# Patient Record
Sex: FEMALE | Race: BLACK OR AFRICAN AMERICAN | NOT HISPANIC OR LATINO | URBAN - METROPOLITAN AREA
[De-identification: names, ages, dates, MRNs, and addresses within clinical notes are randomized per-mention and may not be internally consistent; named-entity substitution may affect disease eponyms.]

---

## 2018-04-26 PROBLEM — Z00.00 ENCOUNTER FOR PREVENTIVE HEALTH EXAMINATION: Status: ACTIVE | Noted: 2018-04-26

## 2018-05-08 ENCOUNTER — OUTPATIENT (OUTPATIENT)
Dept: OUTPATIENT SERVICES | Facility: HOSPITAL | Age: 18
LOS: 1 days | Discharge: HOME | End: 2018-05-08

## 2018-05-08 ENCOUNTER — APPOINTMENT (OUTPATIENT)
Dept: ANTEPARTUM | Facility: CLINIC | Age: 18
End: 2018-05-08

## 2018-05-22 ENCOUNTER — APPOINTMENT (OUTPATIENT)
Dept: ANTEPARTUM | Facility: CLINIC | Age: 18
End: 2018-05-22

## 2018-05-22 ENCOUNTER — OUTPATIENT (OUTPATIENT)
Dept: OUTPATIENT SERVICES | Facility: HOSPITAL | Age: 18
LOS: 1 days | Discharge: HOME | End: 2018-05-22

## 2018-05-22 DIAGNOSIS — O35.8XX0 MATERNAL CARE FOR OTHER (SUSPECTED) FETAL ABNORMALITY AND DAMAGE, NOT APPLICABLE OR UNSPECIFIED: ICD-10-CM

## 2019-04-18 ENCOUNTER — EMERGENCY (EMERGENCY)
Facility: HOSPITAL | Age: 19
LOS: 0 days | Discharge: HOME | End: 2019-04-18
Attending: EMERGENCY MEDICINE | Admitting: EMERGENCY MEDICINE
Payer: MEDICAID

## 2019-04-18 VITALS
TEMPERATURE: 99 F | HEART RATE: 95 BPM | DIASTOLIC BLOOD PRESSURE: 60 MMHG | RESPIRATION RATE: 18 BRPM | SYSTOLIC BLOOD PRESSURE: 135 MMHG | OXYGEN SATURATION: 100 %

## 2019-04-18 DIAGNOSIS — K02.9 DENTAL CARIES, UNSPECIFIED: ICD-10-CM

## 2019-04-18 DIAGNOSIS — K08.89 OTHER SPECIFIED DISORDERS OF TEETH AND SUPPORTING STRUCTURES: ICD-10-CM

## 2019-04-18 PROCEDURE — 99282 EMERGENCY DEPT VISIT SF MDM: CPT

## 2019-04-18 NOTE — ED PROVIDER NOTE - NS ED ROS FT
General: No fever, no chills  Eyes:  No visual changes, eye pain or discharge.  ENMT:  +Right upper tooth pain, No hearing changes, pain, no sore throat or runny nose, no difficulty swallowing  Cardiac:  No chest pain, SOB or edema. No chest pain with exertion.  Respiratory:  No cough or respiratory distress. No hemoptysis. No history of asthma or RAD.  GI:  No nausea, vomiting, diarrhea or abdominal pain.  :  No dysuria, frequency or burning.  MS:  No myalgia, muscle weakness, joint pain or back pain.  Neuro:  No headache or weakness.  No LOC.  Skin:  No skin rash.   Endocrine: No history of thyroid disease or diabetes.

## 2019-04-18 NOTE — ED PROVIDER NOTE - PHYSICAL EXAMINATION
CONSTITUTIONAL: Well-developed; well-nourished; in no acute distress.   SKIN: warm, dry  HEAD: Normocephalic; atraumatic.  EYES: PERRL, EOMI, no conjunctival erythema  ENT: +Tenderness in tooth 1, No nasal discharge; +Dental carries in tooth 32, airway clear, no fluctuance, or swelling  NECK: Supple; non tender.  CARD: S1, S2 normal; no murmurs, gallops, or rubs. Regular rate and rhythm.   RESP: No wheezes, rales or rhonchi.  ABD: soft ntnd  EXT: Normal ROM.  No clubbing, cyanosis or edema.   LYMPH: No acute cervical adenopathy.  NEURO: Alert, oriented, grossly unremarkable  PSYCH: Cooperative, appropriate.

## 2019-04-18 NOTE — CONSULT NOTE ADULT - ASSESSMENT
Medical history reviewed with patient. Blood pressure taken. Explained to patient that she did not have to delay dental care due to pregnancy. Patient had been under the impression that she could not receive the care. Provided patient with a business card for the hours of operation and phone number of the dental clinic as she requires extensive care.     Clinical exam: Generalized heavy calculus. Several visibly cavitated teeth that require extraction.   Radiographic exam and interpretation: Panoramic taken. Teeth #2, #15, #18, #30,  #31, and 32 have caries into the pulp and are not restorable.  Tooth #17 is impacted with occlusal caries and requires extraction. Tooth # 14 has caries and requires excavation and potential restoration. Bitewings are needed to better address the status of the premolars. Periapical radiolucency #30 and #31  Plan: extraction of tooth #2 which is symptomatic. Teeth #15,#17,#18,#30, #31, and #32 are asymptomatic.  Procedure: Explained to patient that she has several teeth that require extraction and about the teeth in the lower left quadrant with infection. Explained that since patient reports pain to tooth #2 this tooth will be extracted. Advised patient to return for comprehensive treatment in order to remove the heavy calculus and extract the teeth. Explained to the patient that she is at high risk for future swellings and is strongly advised to seek care. Explained that despite extracting tooth #2 she might continue to experience discomfort/pain.   Discussed risks/benefits/alternative to treatment as per oral surgery sheet 7/13/00. Consent and side/site signed, dated, timed. Administered 2 carpules 4% articaine 1:100,000 epinephrine via buccal/palatal infiltration. Nonsurgical extraction tooth #2. Curetted socket. Post operative radiograph. Post operative instructions given verbal/written. Hemostasis achieved  Prescriptions: Amoxicillin 500 mg q8h 7 days and Ibuprofen 600 mg PRN  Follow up: with primary dentist for care and/or [facility] for comprehensive care or emergency visits.

## 2019-04-18 NOTE — ED PROVIDER NOTE - OBJECTIVE STATEMENT
18 y F with pmh of dental carries with right upper wisdom tooth pain, no nausea/vomitting, no fever, no swelling, no difficulty swallowing 18 y F with pmh of dental carries with right upper wisdom tooth pain, no nausea/vomitting, no fever, no swelling, no difficulty swallowing.

## 2019-04-18 NOTE — ED PROVIDER NOTE - CLINICAL SUMMARY MEDICAL DECISION MAKING FREE TEXT BOX
Odontalgia - no acute process requiring ED intervention. Suitable for discharge to Dental. Pt voiced understanding and agrees with plan.

## 2019-04-18 NOTE — CONSULT NOTE ADULT - SUBJECTIVE AND OBJECTIVE BOX
Patient reports her dentist telling her that she requires extraction of her wisdom teeth. Patient decided to delay treatment due to pregnancy. Patient reports taking tylenol and motrin as needed for pain

## 2020-07-18 NOTE — ED ADULT NURSE NOTE - EXTENSIONS OF SELF_ADULT
[address] [facility] [place] EMERGENCY DEPT  eMERGENCY dEPARTMENT eNCOUnter      Pt Name: Yasir Garza  MRN: [id number]  [place] 1969  Date of evaluation: 7/18/2020  Provider: Luis Rebolledo MD    [address] [place] [address]       Chief Complaint   Patient presents with    Back Pain     pain getting worse since 4 days         HISTORY OF PRESENT ILLNESS   (Location/Symptom, Timing/Onset,Context/Setting, Quality, Duration, Modifying Factors, Severity)  Note limiting factors. Yasir Garza is a 44 y.o. female who presents to the emergency department with back pain. Patient has a history of scoliosis and sciatica. She says she has broken her tailbone several times in the past.  She said she just moved here and has been doing a lot of lifting over the last few days. She had no fall or injury. Her back pain has gradually been getting worse over the last 4 days. Pain radiates down her right leg. Denies any bowel or bladder incontinence. No numbness or tingling. She said her right leg is weak and gives out at times but this is not a new issue. HPI    NursingNotes were reviewed. REVIEW OF SYSTEMS    (2-9 systems for level 4, 10 or more for level 5)     Review of Systems   Constitutional: Negative for chills and fever. HENT: Negative for rhinorrhea and sore throat. Respiratory: Negative for shortness of breath. Cardiovascular: Negative for chest pain and leg swelling. Gastrointestinal: Negative for abdominal pain, diarrhea, nausea and vomiting. Genitourinary: Negative for difficulty urinating. Musculoskeletal: Positive for back pain. Negative for neck pain. Skin: Negative for rash. Neurological: Positive for weakness. Negative for headaches. Psychiatric/Behavioral: Negative for confusion. A complete review of systems was performed and is negative except as noted above in the HPI.        PAST MEDICAL HISTORY     Past Medical History:   Diagnosis Date    COPD (chronic obstructive pulmonary disease) ([facility] [address]     Scoliosis          SURGICAL HISTORY     History reviewed. No pertinent surgical history. CURRENT MEDICATIONS       Previous Medications    CLONAZEPAM (KLONOPIN) 0.5 MG TABLET    Take 1 mg by mouth 2 times daily as needed. ONDANSETRON (ZOFRAN-ODT) 4 MG DISINTEGRATING TABLET    Take 1 tablet by mouth 3 times daily as needed for Nausea or Vomiting    ZOLPIDEM (AMBIEN) 10 MG TABLET    Take by mouth nightly as needed for Sleep. ALLERGIES     Aspirin; Ibuprofen; Iodine; and Sulfa antibiotics    FAMILY HISTORY     History reviewed. No pertinent family history.        SOCIAL HISTORY       Social History     Socioeconomic History    Marital status:      Spouse name: None    Number of children: None    Years of education: None    Highest education level: None   Occupational History    None   Social Needs    Financial resource strain: None    Food insecurity     Worry: None     Inability: None    Transportation needs     Medical: None     Non-medical: None   Tobacco Use    Smoking status: Current Every Day Smoker     Packs/day: 0.50    Smokeless tobacco: Never Used   Substance and Sexual Activity    Alcohol use: Never     Frequency: Never    Drug use: Never    Sexual activity: None   Lifestyle    Physical activity     Days per week: None     Minutes per session: None    Stress: None   Relationships    Social connections     Talks on phone: None     Gets together: None     Attends Baptism service: None     Active member of club or organization: None     Attends meetings of clubs or organizations: None     Relationship status: None    Intimate partner violence     Fear of current or ex partner: None     Emotionally abused: None     Physically abused: None     Forced sexual activity: None   Other Topics Concern    None   Social History Narrative    None       SCREENINGS             PHYSICAL EXAM    (up to 7 for level 4, 8 or more for level 5)     ED Triage Vitals [07/18/20 0724]   BP Temp Temp src Pulse Resp SpO2 Height Weight   104/81 98.3 °F (36.8 °C) -- 82 18 98 % 5' 1\" (1.549 m) 105 lb (47.6 kg)       Physical Exam  Vitals signs and nursing note reviewed. Constitutional:       General: She is not in acute distress. Appearance: She is well-developed. She is not diaphoretic. HENT:      Head: Normocephalic and atraumatic. Eyes:      Pupils: Pupils are equal, round, and reactive to light. Neck:      Musculoskeletal: Normal range of motion and neck supple. Cardiovascular:      Rate and Rhythm: Normal rate and regular rhythm. Heart sounds: Normal heart sounds. Pulmonary:      Effort: Pulmonary effort is normal. No respiratory distress. Breath sounds: Normal breath sounds. Abdominal:      General: Bowel sounds are normal. There is no distension. Palpations: Abdomen is soft. Tenderness: There is no abdominal tenderness. Musculoskeletal: Normal range of motion. General: Signs of injury present. Comments: Normal reflexes and sensation. Diffuse soft tissue tenderness over lower back   Skin:     General: Skin is warm and dry. Findings: No rash. Neurological:      Mental Status: She is alert and oriented to person, place, and time. Cranial Nerves: No cranial nerve deficit. Motor: No abnormal muscle tone.       Coordination: Coordination normal.      Deep Tendon Reflexes: Reflexes normal.   Psychiatric:         Behavior: Behavior normal.         DIAGNOSTIC RESULTS     EKG: All EKG's are interpreted by the Emergency Department Physician who either signs or Co-signs this chart in the absence of a cardiologist.        RADIOLOGY:   Non-plain film images such as CT, Ultrasound and MRI are read by the radiologist. Plainradiographic images are visualized and preliminarily interpreted by the emergency physician with the below findings:        Interpretation per the Radiologist below, if available at the time of this note:    No orders to display ED BEDSIDE ULTRASOUND:   Performed by ED Physician - none    LABS:  Labs Reviewed - No data to display    All other labs were within normal range or not returned as of this dictation. EMERGENCY DEPARTMENT COURSE and DIFFERENTIALDIAGNOSIS/MDM:   Vitals:    Vitals:    07/18/20 0724   BP: 104/81   Pulse: 82   Resp: 18   Temp: 98.3 °F (36.8 °C)   SpO2: 98%   Weight: 105 lb (47.6 kg)   Height: 5' 1\" (1.549 m)       MDM  Ho chronic issue with no saddle anesthesia or incontinence. No fall. Trey Sullivan [id number]    CONSULTS:  None    PROCEDURES:  Unless otherwise notedbelow, none     Procedures    FINAL IMPRESSION     1. Acute exacerbation of chronic low back pain          DISPOSITION/PLAN   DISPOSITION Discharge - Pending Orders Complete 07/18/2020 08:03:34 AM      PATIENT REFERRED TO:  @FUP@    DISCHARGE MEDICATIONS:  New Prescriptions    CYCLOBENZAPRINE (FLEXERIL) 10 MG TABLET    Take 1 tablet by mouth 3 times daily as needed for Muscle spasms    HYDROCODONE-ACETAMINOPHEN (NORCO) 5-325 MG PER TABLET    Take 1 tablet by mouth every 6 hours as needed for Pain for up to 3 days.           (Please note that portions of this note were completed with a voice recognition program.  Efforts were made to edit the dictations butoccasionally words are mis-transcribed.)    Gianni Key MD (electronically signed)  AttendingEmergency Physician         Gianni Key MD  07/18/20 0167 None

## 2020-09-14 ENCOUNTER — EMERGENCY (EMERGENCY)
Facility: HOSPITAL | Age: 20
LOS: 0 days | Discharge: HOME | End: 2020-09-14
Attending: EMERGENCY MEDICINE | Admitting: EMERGENCY MEDICINE
Payer: MEDICAID

## 2020-09-14 VITALS
SYSTOLIC BLOOD PRESSURE: 126 MMHG | RESPIRATION RATE: 18 BRPM | TEMPERATURE: 98 F | HEART RATE: 77 BPM | DIASTOLIC BLOOD PRESSURE: 78 MMHG | OXYGEN SATURATION: 99 %

## 2020-09-14 DIAGNOSIS — K08.89 OTHER SPECIFIED DISORDERS OF TEETH AND SUPPORTING STRUCTURES: ICD-10-CM

## 2020-09-14 DIAGNOSIS — F17.200 NICOTINE DEPENDENCE, UNSPECIFIED, UNCOMPLICATED: ICD-10-CM

## 2020-09-14 PROCEDURE — 99282 EMERGENCY DEPT VISIT SF MDM: CPT

## 2020-09-14 NOTE — ED PROVIDER NOTE - PHYSICAL EXAMINATION
VITALS:  I have reviewed the initial vital signs.  GENERAL: Well-developed, well-nourished, in no acute distress. Nontoxic.  HEENT: MMM, tolerating oral secretions. Multiple caries with +ttp over tooth #2. No abscess. No floor of mouth or submandibular swelling. No tongue elevation.    NECK: supple w FROM.   PULM: Normal effort. No tachypnea or retractions. No stridor.   SKIN: Warm, dry.  NEURO: A&Ox3. Speech clear. CN II-XII intact. No focal deficits.

## 2020-09-14 NOTE — ED PROVIDER NOTE - OBJECTIVE STATEMENT
20 year old female w no pmhx presents to the ED for evaluation of constant, mild, non-radiating right upper dental pain x 3 days. Pt went to [facility] ED on Friday for same symptoms, started on Amoxicillin and Percocet w/o improvement in her pain. Denies fevers/chills, facial swelling, sore throat, difficulty swallowing, voice change, difficulty breathing, n/v, recent dental procedures.

## 2020-09-14 NOTE — ED PROVIDER NOTE - NS ED ROS FT
CONSTITUTIONAL: (-) fevers, (-) chills, (-) malaise  ENT: see HPI, (-) facial swelling, (-) difficulty swallowing  NECK: (-) neck pain, (-) neck stiffness  PULM: (-) cough, (-) shortness of breath, (-) stridor  GI: (-) nausea, (-) vomiting    *all other systems negative except as documented above and in the HPI*

## 2020-09-18 PROBLEM — Z78.9 OTHER SPECIFIED HEALTH STATUS: Chronic | Status: ACTIVE | Noted: 2019-04-18

## 2022-04-08 ENCOUNTER — EMERGENCY (EMERGENCY)
Facility: HOSPITAL | Age: 22
LOS: 0 days | Discharge: HOME | End: 2022-04-08
Attending: EMERGENCY MEDICINE | Admitting: EMERGENCY MEDICINE
Payer: MEDICAID

## 2022-04-08 VITALS
SYSTOLIC BLOOD PRESSURE: 171 MMHG | DIASTOLIC BLOOD PRESSURE: 70 MMHG | OXYGEN SATURATION: 100 % | RESPIRATION RATE: 17 BRPM | TEMPERATURE: 98 F | HEART RATE: 88 BPM

## 2022-04-08 DIAGNOSIS — K08.89 OTHER SPECIFIED DISORDERS OF TEETH AND SUPPORTING STRUCTURES: ICD-10-CM

## 2022-04-08 DIAGNOSIS — K03.81 CRACKED TOOTH: ICD-10-CM

## 2022-04-08 DIAGNOSIS — K02.9 DENTAL CARIES, UNSPECIFIED: ICD-10-CM

## 2022-04-08 DIAGNOSIS — F17.200 NICOTINE DEPENDENCE, UNSPECIFIED, UNCOMPLICATED: ICD-10-CM

## 2022-04-08 PROCEDURE — 99284 EMERGENCY DEPT VISIT MOD MDM: CPT

## 2022-04-08 NOTE — ED PROVIDER NOTE - NS ED ATTENDING STATEMENT MOD
[Nl] : Integumentary This was a shared visit with the TOOTIE. I reviewed and verified the documentation and independently performed the documented:

## 2022-04-08 NOTE — ED PROVIDER NOTE - OBJECTIVE STATEMENT
21-year-old with no past medical history presenting with right lower gum pain x2 days. Symptoms are moderate Pain is worse with palpation. No alleviating factors.  States that part of her tooth fell off 2 days ago while eating. No fever, chills, difficulty breathing, foul odor, trismus, discharge, warmth, decreased PO fluids, malaise, loose teeth.

## 2022-04-08 NOTE — ED ADULT NURSE NOTE - OBJECTIVE STATEMENT
pt c/o right lower gum pain. right lower molar not present. redness and swelling noted to right lower gums. denies fever/chills.

## 2022-04-08 NOTE — ED PROVIDER NOTE - NS ED ROS FT
Review of Systems:  	•	CONSTITUTIONAL - no fever, no diaphoresis, no chills  	•	SKIN - no rash  	•	HEMATOLOGIC - no bleeding, no bruising  	•	EYES - no eye pain, no blurry vision  	•	ENT - +gum pain no congestion  	•	RESPIRATORY - no shortness of breath, no cough  	•	CARDIAC - no chest pain, no palpitations  	•	GI - no nausea, no vomiting  	•	GENITO-URINARY - no dysuria; no hematuria, no increased urinary frequency  	•	MUSCULOSKELETAL - no joint paint, no swelling, no redness  	•	NEUROLOGIC - no weakness, no headache, no paresthesias, no LOC  	•	PSYCH - no anxiety, no depression  	All other ROS are negative except as documented in HPI.

## 2022-04-08 NOTE — ED ADULT NURSE NOTE - NSIMPLEMENTINTERV_GEN_ALL_ED
Implemented All Universal Safety Interventions:  [place] to call system. Call bell, personal items and telephone within reach. Instruct patient to call for assistance. Room bathroom lighting operational. Non-slip footwear when patient is off stretcher. Physically safe environment: no spills, clutter or unnecessary equipment. Stretcher in lowest position, wheels locked, appropriate side rails in place.

## 2022-04-08 NOTE — ED PROVIDER NOTE - ATTENDING CONTRIBUTION TO CARE
22 yo   presents with toothache.  Tender gum in space for tooth# 30, broken.  No abscess.  [facility] dental clinic 20 yo f presents with toothache.  Tender gum in space for tooth# 30, broken.  No abscess.  [facility] dental clinic

## 2022-04-08 NOTE — ED PROVIDER NOTE - ATTENDING SHARED VISIT SELECTORS
Medical Decision Making
covid vaccine: moderna 2 doses   Denies history of covid infection, recent international travel or exposure to known +covid person

## 2022-04-08 NOTE — CONSULT NOTE ADULT - SUBJECTIVE AND OBJECTIVE BOX
Patient is a 21y old  Female who presents with a chief complaint of tooth pain on the lower right side.    HPI: Patient reported pain started a few days ago. Patient reported taking motrin and tylenol and the medications were not helping.      PAST MEDICAL & SURGICAL HISTORY:  No pertinent past medical history    ( -  ) heart valve replacement  ( -  ) joint replacement  ( -  ) pregnancy    MEDICATIONS  (STANDING):    MEDICATIONS  (PRN): Motrin, Tylenol      Allergies    No Known Allergies    *SOCIAL HISTORY: (  - ) Tobacco; ( -  ) ETOH    *Last Dental Visit: unknown    Vital Signs Last 24 Hrs  T(C): 36.4 (08 Apr 2022 09:32), Max: 36.4 (08 Apr 2022 09:32)  T(F): 97.5 (08 Apr 2022 09:32), Max: 97.5 (08 Apr 2022 09:32)  HR: 88 (08 Apr 2022 09:32) (88 - 88)  BP: 171/70 (08 Apr 2022 09:32) (171/70 - 171/70)  BP(mean): --  RR: 17 (08 Apr 2022 09:32) (17 - 17)  SpO2: 100% (08 Apr 2022 09:32) (100% - 100%)    EOE:  TMJ ( -  ) clicks                     ( -  ) pops                     ( -  ) crepitus             Mandible <<FROM>>             Facial bones and MOM <<grossly intact>>             ( -  ) trismus             ( -  ) lymphadenopathy             ( -  ) swelling             ( -  ) asymmetry             ( -  ) palpation             ( -  ) dyspnea             ( -  ) dysphagia             ( -  ) loss of consciousness    IOE:  <<permanent> dentition: <<multiple carious teeth>>            hard/soft palate:  ( -  ) palatal torus, <<No pathology noted>>           tongue/FOM <<No pathology noted>>           labial/buccal mucosa <<No pathology noted>>           ( + ) percussion: #30,31, and #32           ( + ) palpation: right posterior lower area           ( + ) swelling: in the area of #30,31,32           ( + ) abscess: buccal gingival area of #30 and 31           ( - ) sinus tract    *DENTAL RADIOGRAPHS: Panoramic and periapical. retained root tips of #30 and 31 with periapical radiolucency. #32 large dental caries, horizontally impacted.    *ASSESSMENT: Patient is a 21y old  Female who presents with a chief complaint of tooth pain on the lower right side. Patient reported pain started a few days ago. Patient reported taking motrin and tylenol and the medications were not helping. Extraoral exam within normal limit. Intraoral exam noted swelling in the area of #30, 31, and 32, tender to palpation. Retained root tips of 30 and 31. #32 with clinical caries into the pulp. Recommend extraction of 30, 31, and 32. Patient agrees.    *PLAN: Extraction of #30, 31, and 32 under local anesthesia. Antibiotic and pain medication.    PROCEDURE: Extraction of #30, 31, and 32 under local anesthesia attempted.  Verbal and written consent given.  Anesthesia: 2% lidocaine with 1:100,000 epinephrine and 3% mepivacaine  Treatment: Risk and benefit discussed with patient as per OS sheet dated 7/13/2000. Consent signed. Side/site verified. Topical applied. Inferior alveolar never block, long buccal, local infiltrations with 3 carpules of 2% lidocaine with 1:100,000 and 2 carpules of 3% mepivacaine. Patient reported feeling numb on the tongue, right lower lip, as well as the buccal and lingual gingiva. Unable to achieve adequate anesthesia for patient to tolerate procedure due to existing infection. Recommend patient to take antibiotics and pain medication and return for oral surgery appointment next week. Patient understands and agrees. Hemostasis achieved. Prescribed amoxicillin 500 mg T1D for 7 days and Ibuprofen 600 mg q6h as needed for pain.    RECOMMENDATIONS:  1) Patient to take prescribed medication.   2) Patient to return to dental clinic next Wednesday for oral surgery appointment.   3) If any difficulty swallowing/breathing, fever occur, return to ER.     Iva Santana DDS, pager #[phone]

## 2022-04-08 NOTE — ED PROVIDER NOTE - PHYSICAL EXAMINATION
VITAL SIGNS: I have reviewed nursing notes and confirm.  CONSTITUTIONAL: Well-developed; well-nourished; in no acute distress.  SKIN: Skin exam is warm and dry, no acute rash.  HEAD: Normocephalic; atraumatic.  EYES: PERRL, EOM intact; conjunctiva and sclera clear.  ENT: +Tooth fracture noted to tooth #31 with gingival/vestibular swelling with tenderness to percussion. No nasal discharge; airway clear.   NECK: Supple; non tender.  CARD: S1, S2 normal; no murmurs, gallops, or rubs. Regular rate and rhythm.  RESP: Clear to auscultation bilaterally. No wheezes, rales or rhonchi.  EXT: Normal ROM. No edema.  LYMPH: No acute cervical adenopathy.  NEURO: Alert, oriented. Grossly unremarkable. No focal deficits.  PSYCH: Cooperative, appropriate.

## 2022-04-13 ENCOUNTER — OUTPATIENT (OUTPATIENT)
Dept: OUTPATIENT SERVICES | Facility: HOSPITAL | Age: 22
LOS: 1 days | Discharge: HOME | End: 2022-04-13